# Patient Record
Sex: MALE
[De-identification: names, ages, dates, MRNs, and addresses within clinical notes are randomized per-mention and may not be internally consistent; named-entity substitution may affect disease eponyms.]

---

## 2017-05-12 ENCOUNTER — APPOINTMENT (OUTPATIENT)
Dept: BARIATRICS/WEIGHT MGMT | Facility: CLINIC | Age: 47
End: 2017-05-12

## 2017-05-12 VITALS
HEIGHT: 72 IN | BODY MASS INDEX: 33.93 KG/M2 | HEART RATE: 97 BPM | TEMPERATURE: 98 F | OXYGEN SATURATION: 98 % | SYSTOLIC BLOOD PRESSURE: 138 MMHG | DIASTOLIC BLOOD PRESSURE: 88 MMHG | WEIGHT: 250.5 LBS

## 2017-05-12 DIAGNOSIS — K21.9 GASTRO-ESOPHAGEAL REFLUX DISEASE W/OUT ESOPHAGITIS: ICD-10-CM

## 2017-08-01 ENCOUNTER — MESSAGE (OUTPATIENT)
Age: 47
End: 2017-08-01

## 2017-08-04 ENCOUNTER — APPOINTMENT (OUTPATIENT)
Dept: BARIATRICS/WEIGHT MGMT | Facility: CLINIC | Age: 47
End: 2017-08-04
Payer: COMMERCIAL

## 2017-08-04 VITALS
WEIGHT: 245 LBS | OXYGEN SATURATION: 98 % | TEMPERATURE: 98.4 F | DIASTOLIC BLOOD PRESSURE: 90 MMHG | BODY MASS INDEX: 33.18 KG/M2 | HEIGHT: 72 IN | SYSTOLIC BLOOD PRESSURE: 142 MMHG | HEART RATE: 100 BPM

## 2017-08-04 PROCEDURE — 99214 OFFICE O/P EST MOD 30 MIN: CPT

## 2017-08-18 ENCOUNTER — APPOINTMENT (OUTPATIENT)
Dept: BARIATRICS/WEIGHT MGMT | Facility: CLINIC | Age: 47
End: 2017-08-18
Payer: COMMERCIAL

## 2017-08-18 VITALS
HEART RATE: 61 BPM | SYSTOLIC BLOOD PRESSURE: 138 MMHG | TEMPERATURE: 98.1 F | OXYGEN SATURATION: 98 % | DIASTOLIC BLOOD PRESSURE: 90 MMHG | HEIGHT: 72 IN | WEIGHT: 246 LBS | BODY MASS INDEX: 33.32 KG/M2

## 2017-08-18 PROCEDURE — 99214 OFFICE O/P EST MOD 30 MIN: CPT

## 2017-09-08 ENCOUNTER — APPOINTMENT (OUTPATIENT)
Dept: BARIATRICS/WEIGHT MGMT | Facility: CLINIC | Age: 47
End: 2017-09-08
Payer: COMMERCIAL

## 2017-09-08 VITALS
TEMPERATURE: 98 F | SYSTOLIC BLOOD PRESSURE: 124 MMHG | OXYGEN SATURATION: 98 % | DIASTOLIC BLOOD PRESSURE: 72 MMHG | BODY MASS INDEX: 33.12 KG/M2 | WEIGHT: 244.5 LBS | HEART RATE: 77 BPM | HEIGHT: 72 IN

## 2017-09-08 PROCEDURE — 99214 OFFICE O/P EST MOD 30 MIN: CPT

## 2017-09-29 ENCOUNTER — RX RENEWAL (OUTPATIENT)
Age: 47
End: 2017-09-29

## 2017-09-29 ENCOUNTER — APPOINTMENT (OUTPATIENT)
Dept: BARIATRICS/WEIGHT MGMT | Facility: CLINIC | Age: 47
End: 2017-09-29
Payer: COMMERCIAL

## 2017-09-29 VITALS
HEART RATE: 88 BPM | OXYGEN SATURATION: 98 % | SYSTOLIC BLOOD PRESSURE: 136 MMHG | BODY MASS INDEX: 32.1 KG/M2 | HEIGHT: 72 IN | WEIGHT: 237 LBS | DIASTOLIC BLOOD PRESSURE: 80 MMHG | TEMPERATURE: 98 F

## 2017-09-29 PROCEDURE — 99213 OFFICE O/P EST LOW 20 MIN: CPT

## 2017-09-29 RX ORDER — PEN NEEDLE, DIABETIC 30 GX 1/3"
32G X 5 MM NEEDLE, DISPOSABLE MISCELLANEOUS
Qty: 90 | Refills: 2 | Status: ACTIVE | COMMUNITY
Start: 2017-09-29 | End: 1900-01-01

## 2017-09-29 RX ORDER — LANCETS 30 GAUGE
EACH MISCELLANEOUS
Qty: 120 | Refills: 3 | Status: ACTIVE | COMMUNITY
Start: 2017-09-29 | End: 1900-01-01

## 2017-11-10 ENCOUNTER — APPOINTMENT (OUTPATIENT)
Dept: BARIATRICS/WEIGHT MGMT | Facility: CLINIC | Age: 47
End: 2017-11-10
Payer: COMMERCIAL

## 2017-11-10 VITALS
DIASTOLIC BLOOD PRESSURE: 70 MMHG | OXYGEN SATURATION: 98 % | TEMPERATURE: 97.9 F | HEART RATE: 87 BPM | SYSTOLIC BLOOD PRESSURE: 124 MMHG | HEIGHT: 72 IN | WEIGHT: 237.5 LBS | BODY MASS INDEX: 32.17 KG/M2

## 2017-11-10 PROCEDURE — 99214 OFFICE O/P EST MOD 30 MIN: CPT

## 2017-11-10 RX ORDER — ATORVASTATIN CALCIUM 10 MG/1
10 TABLET, FILM COATED ORAL
Qty: 30 | Refills: 0 | Status: ACTIVE | COMMUNITY
Start: 2017-10-24

## 2017-11-10 RX ORDER — AZELASTINE HYDROCHLORIDE AND FLUTICASONE PROPIONATE 137; 50 UG/1; UG/1
137-50 SPRAY, METERED NASAL
Qty: 23 | Refills: 0 | Status: ACTIVE | COMMUNITY
Start: 2017-09-06

## 2017-12-08 ENCOUNTER — APPOINTMENT (OUTPATIENT)
Dept: BARIATRICS/WEIGHT MGMT | Facility: CLINIC | Age: 47
End: 2017-12-08

## 2018-01-05 ENCOUNTER — APPOINTMENT (OUTPATIENT)
Dept: BARIATRICS/WEIGHT MGMT | Facility: CLINIC | Age: 48
End: 2018-01-05
Payer: COMMERCIAL

## 2018-01-05 VITALS
BODY MASS INDEX: 32.91 KG/M2 | HEIGHT: 72 IN | DIASTOLIC BLOOD PRESSURE: 88 MMHG | OXYGEN SATURATION: 98 % | WEIGHT: 243 LBS | TEMPERATURE: 97.9 F | HEART RATE: 77 BPM | SYSTOLIC BLOOD PRESSURE: 140 MMHG

## 2018-01-05 PROCEDURE — 99214 OFFICE O/P EST MOD 30 MIN: CPT

## 2018-02-01 ENCOUNTER — APPOINTMENT (OUTPATIENT)
Dept: BARIATRICS/WEIGHT MGMT | Facility: CLINIC | Age: 48
End: 2018-02-01

## 2018-02-23 ENCOUNTER — APPOINTMENT (OUTPATIENT)
Dept: BARIATRICS/WEIGHT MGMT | Facility: CLINIC | Age: 48
End: 2018-02-23
Payer: COMMERCIAL

## 2018-02-23 VITALS
DIASTOLIC BLOOD PRESSURE: 88 MMHG | OXYGEN SATURATION: 98 % | HEIGHT: 72 IN | SYSTOLIC BLOOD PRESSURE: 152 MMHG | TEMPERATURE: 98.5 F | WEIGHT: 245 LBS | HEART RATE: 98 BPM | BODY MASS INDEX: 33.18 KG/M2

## 2018-02-23 DIAGNOSIS — F41.8 OTHER SPECIFIED ANXIETY DISORDERS: ICD-10-CM

## 2018-02-23 PROCEDURE — 99214 OFFICE O/P EST MOD 30 MIN: CPT

## 2018-03-23 ENCOUNTER — RX RENEWAL (OUTPATIENT)
Age: 48
End: 2018-03-23

## 2018-04-13 ENCOUNTER — APPOINTMENT (OUTPATIENT)
Dept: BARIATRICS/WEIGHT MGMT | Facility: CLINIC | Age: 48
End: 2018-04-13
Payer: COMMERCIAL

## 2018-04-13 VITALS
HEART RATE: 94 BPM | WEIGHT: 257.5 LBS | HEIGHT: 72 IN | BODY MASS INDEX: 34.88 KG/M2 | SYSTOLIC BLOOD PRESSURE: 146 MMHG | TEMPERATURE: 97.8 F | OXYGEN SATURATION: 97 % | DIASTOLIC BLOOD PRESSURE: 86 MMHG

## 2018-04-13 PROCEDURE — 99214 OFFICE O/P EST MOD 30 MIN: CPT

## 2020-01-09 ENCOUNTER — TRANSCRIPTION ENCOUNTER (OUTPATIENT)
Age: 50
End: 2020-01-09

## 2020-01-10 ENCOUNTER — APPOINTMENT (OUTPATIENT)
Dept: BARIATRICS/WEIGHT MGMT | Facility: CLINIC | Age: 50
End: 2020-01-10
Payer: COMMERCIAL

## 2020-01-10 VITALS
OXYGEN SATURATION: 88 % | DIASTOLIC BLOOD PRESSURE: 84 MMHG | SYSTOLIC BLOOD PRESSURE: 156 MMHG | BODY MASS INDEX: 36.34 KG/M2 | HEIGHT: 72 IN | HEART RATE: 96 BPM | TEMPERATURE: 98 F | WEIGHT: 268.31 LBS

## 2020-01-10 PROCEDURE — 99214 OFFICE O/P EST MOD 30 MIN: CPT

## 2020-01-10 RX ORDER — ATENOLOL 25 MG/1
25 TABLET ORAL
Refills: 0 | Status: ACTIVE | COMMUNITY

## 2020-01-10 RX ORDER — NALTREXONE HYDROCHLORIDE 50 MG/1
50 TABLET, FILM COATED ORAL DAILY
Qty: 90 | Refills: 1 | Status: DISCONTINUED | COMMUNITY
Start: 2018-01-05 | End: 2020-01-10

## 2020-01-10 RX ORDER — CYCLOSPORINE 0.5 MG/ML
0.05 EMULSION OPHTHALMIC
Qty: 18 | Refills: 0 | Status: DISCONTINUED | COMMUNITY
Start: 2017-08-11 | End: 2020-01-10

## 2020-01-10 RX ORDER — OLOPATADINE HYDROCHLORIDE 7 MG/ML
0.7 SOLUTION OPHTHALMIC
Qty: 2 | Refills: 0 | Status: DISCONTINUED | COMMUNITY
Start: 2017-08-28 | End: 2020-01-10

## 2020-01-10 RX ORDER — EMPAGLIFLOZIN 10 MG/1
10 TABLET, FILM COATED ORAL
Qty: 30 | Refills: 0 | Status: DISCONTINUED | COMMUNITY
Start: 2017-08-11 | End: 2020-01-10

## 2020-01-10 RX ORDER — AMLODIPINE BESYLATE 10 MG/1
10 TABLET ORAL
Refills: 0 | Status: ACTIVE | COMMUNITY

## 2020-01-31 ENCOUNTER — APPOINTMENT (OUTPATIENT)
Dept: BARIATRICS/WEIGHT MGMT | Facility: CLINIC | Age: 50
End: 2020-01-31
Payer: COMMERCIAL

## 2020-01-31 VITALS
OXYGEN SATURATION: 96 % | HEIGHT: 72 IN | BODY MASS INDEX: 35.39 KG/M2 | TEMPERATURE: 98.1 F | WEIGHT: 261.31 LBS | SYSTOLIC BLOOD PRESSURE: 120 MMHG | DIASTOLIC BLOOD PRESSURE: 77 MMHG | HEART RATE: 89 BPM

## 2020-01-31 PROCEDURE — 99214 OFFICE O/P EST MOD 30 MIN: CPT

## 2020-02-09 NOTE — ASSESSMENT
[FreeTextEntry1] : BARIATRIC SURGERY HISTORY: RYGB (max weight 390)\par \par OBESITY COMORBIDITIES: DM2 (back on insulin), metabolic syndrome, AUUGSTINE\par \par ANTI-OBESITY MEDICATIONS: off and on GLP-1 agonist\par \par OBESITY MEDICATION SIDE EFFECTS: none\par \par we had a lengthy talk about his glycemic control and carb free attempts at weight loss.  it is conceivable that his multiple issues with acidosis were in the setting of complete carb restriction/ketoacidosis that was diet related and not from massive hyperglycemia.  As such recommend a whole foods based strategy that reintroduces legumes, possibly some fruit and increases vegetable and fiber intake.  also recommend:\par \par 1. restart glp-1 agonist.  will give trulicity 0.75 mg at first\par 2. decrease insulin to 20 units/day but given his knowledge/experience discretion to lower amount\par 3. increase cardio exercise\par 4. keep food, glucose and activity logs\par \par rtc in 3 weeks and will likely increase trulicity and decrease insulin at that time.\par \par \par

## 2020-02-09 NOTE — ASSESSMENT
[FreeTextEntry1] : BARIATRIC SURGERY HISTORY: RYGB (max weight 390)\par \par OBESITY COMORBIDITIES: DM2 (back on insulin), metabolic syndrome, AUGUSTINE\par \par ANTI-OBESITY MEDICATIONS: off and on GLP-1 agonist\par \par OBESITY MEDICATION SIDE EFFECTS: none\par \par we had a conversation about trying to avoid saturated fat altogether (increases insulin resistance).  otherwise stick to whole foods, mainly plant based and keep grains relatively restricted (but fruits, vegetables and legumes should be eaten more).  cont with increased exercise.\par increase trulicity to 1.5mg/weekly\par decrease insulin to 10 units QHS though with insulin resistance that is low dose, so low threshold to DC\par rtc in 3 weeks again.\par \par rtc in 3 weeks and will likely increase trulicity and decrease insulin at that time.\par \par \par

## 2020-02-09 NOTE — HISTORY OF PRESENT ILLNESS
[FreeTextEntry1] : 50 yo man with obesity (s/p RYGB), metabolic syndrome and AUGUSTINE returns to clinic.  Moved to [place] and was there for 1.5 years.  He regained 11 lbs.  Had 1 hospitalization for lactic acidosis and was put back on insulin.  now taking 40 units/day long acting.  He continues trying to stick to a completely carbohydrate restricted diet (in spite of now two episodes of acidosis).  He is active again now and is without new complaints today.

## 2020-02-09 NOTE — HISTORY OF PRESENT ILLNESS
[FreeTextEntry1] : 48 yo man with obesity (s/p RYGB), metabolic syndrome and AUGUSTINE returns to clinic. He has lost 7 lbs in past three weeks.  he has introduced more produce and legumes to his diet and dramatically reduced refined foods and fat.  he is moderately active.  glucose well controlled on decreased insulin.  He is without new complaints today.

## 2020-02-28 ENCOUNTER — APPOINTMENT (OUTPATIENT)
Dept: BARIATRICS/WEIGHT MGMT | Facility: CLINIC | Age: 50
End: 2020-02-28
Payer: COMMERCIAL

## 2020-02-28 VITALS
OXYGEN SATURATION: 98 % | TEMPERATURE: 97.8 F | SYSTOLIC BLOOD PRESSURE: 120 MMHG | BODY MASS INDEX: 34.72 KG/M2 | DIASTOLIC BLOOD PRESSURE: 78 MMHG | WEIGHT: 256.31 LBS | HEART RATE: 80 BPM | HEIGHT: 72 IN

## 2020-02-28 PROCEDURE — 99214 OFFICE O/P EST MOD 30 MIN: CPT

## 2020-03-23 NOTE — HISTORY OF PRESENT ILLNESS
[FreeTextEntry1] : 50 yo man with obesity (s/p RYGB), metabolic syndrome and AUGUSTINE returns to clinic. He has lost an additional 5 lbs in past 4 weeks weeks (now 12 lbs in past seven weeks).  he has introduced more produce and legumes to his diet and dramatically reduced refined foods and fat.  he is moderately active.  glucose well controlled now back off of insulin - still on glp-1 agonist and metformin.  He is without new complaints today.

## 2020-03-23 NOTE — ASSESSMENT
[FreeTextEntry1] : BARIATRIC SURGERY HISTORY: RYGB (max weight 390)\par \par OBESITY COMORBIDITIES: DM2 (now back off of insulin), metabolic syndrome, AUGUSTINE\par \par ANTI-OBESITY MEDICATIONS: off and on GLP-1 agonist\par \par OBESITY MEDICATION SIDE EFFECTS: none\par \par continue to advance unrefined carbohydrates particularly legumes and produce\par increased exercise.\par cont trulicity to 1.5mg/weekly\par \par \par rtc in 3 weeks\par \par \par

## 2020-03-27 ENCOUNTER — APPOINTMENT (OUTPATIENT)
Dept: BARIATRICS/WEIGHT MGMT | Facility: CLINIC | Age: 50
End: 2020-03-27
Payer: COMMERCIAL

## 2020-03-27 PROCEDURE — 99442: CPT

## 2020-04-24 ENCOUNTER — APPOINTMENT (OUTPATIENT)
Dept: BARIATRICS/WEIGHT MGMT | Facility: CLINIC | Age: 50
End: 2020-04-24
Payer: COMMERCIAL

## 2020-04-24 PROCEDURE — 99213 OFFICE O/P EST LOW 20 MIN: CPT | Mod: 95

## 2020-04-24 NOTE — HISTORY OF PRESENT ILLNESS
[FreeTextEntry1] : 50 yo man with obesity (s/p RYGB), metabolic syndrome and AUGUSTINE returns to clinic. body weight decrease continues with same slope.  He has now lost 10% of body weight in previous 3+ months since return.  He is happy with progress.  He has been staying away from refined carbohydrates and added saturated fats.  He is walking daily, but less overall than usual and no other formal exercise.  AM fasting glucose 120-130 range still without insulin.  He is without new complaints today.

## 2020-04-24 NOTE — ASSESSMENT
[FreeTextEntry1] : BARIATRIC SURGERY HISTORY: RYGB (max weight 390)\par \par OBESITY COMORBIDITIES: DM2 (now back off of insulin), metabolic syndrome, AUGUSTINE\par \par ANTI-OBESITY MEDICATIONS: off and on GLP-1 agonist\par \par OBESITY MEDICATION SIDE EFFECTS: none\par \par cont whole foods, lower fat, high fiber diet\par needs to find daily exercise regimen, add home exercise to walking\par cont trulicity to 1.5mg/weekly\par \par \par rtc in 3 weeks\par \par \par

## 2020-04-24 NOTE — REASON FOR VISIT
[Diabetes Mellitus] : diabetes mellitus [Follow-Up Visit] : a follow-up visit for [Obesity] : obesity

## 2020-06-12 ENCOUNTER — APPOINTMENT (OUTPATIENT)
Dept: BARIATRICS/WEIGHT MGMT | Facility: CLINIC | Age: 50
End: 2020-06-12
Payer: COMMERCIAL

## 2020-06-12 PROCEDURE — 99213 OFFICE O/P EST LOW 20 MIN: CPT | Mod: 95

## 2020-06-23 RX ORDER — DULAGLUTIDE 0.75 MG/.5ML
0.75 INJECTION, SOLUTION SUBCUTANEOUS
Qty: 1 | Refills: 5 | Status: DISCONTINUED | COMMUNITY
Start: 2020-01-14 | End: 2020-06-23
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
## 2020-08-07 ENCOUNTER — APPOINTMENT (OUTPATIENT)
Dept: BARIATRICS/WEIGHT MGMT | Facility: CLINIC | Age: 50
End: 2020-08-07
Payer: COMMERCIAL

## 2020-08-07 PROCEDURE — 99213 OFFICE O/P EST LOW 20 MIN: CPT | Mod: 95

## 2020-08-07 NOTE — REASON FOR VISIT
[Diabetes Mellitus] : diabetes mellitus [Follow-Up Visit] : a follow-up visit for [Obesity] : obesity [Obstructive Sleep Apena] : obstructive sleep apena

## 2020-08-07 NOTE — ASSESSMENT
[FreeTextEntry1] : BARIATRIC SURGERY HISTORY: RYGB (max weight 390)\par \par OBESITY COMORBIDITIES: DM2 (now back off of insulin), metabolic syndrome, AUGUSTINE\par \par ANTI-OBESITY MEDICATIONS: off and on GLP-1 agonist\par \par OBESITY MEDICATION SIDE EFFECTS: none\par \par cont whole foods, lower fat, high fiber diet\par cont trulicity to 1.5mg/weekly\par cont regular walking\par add squats, pushups, etc after walk to improve insulin sensitivity\par will check some other pre-prandial glucose values as well as AM\par \par rtc in 6 weeks.\par \par \par rtc in 3 weeks\par \par \par

## 2020-08-07 NOTE — HISTORY OF PRESENT ILLNESS
[Home] : at home, [unfilled] , at the time of the visit. [Medical Office: ([facility])___] : at the medical office located in  [FreeTextEntry1] : 50 yo man with obesity (s/p RYGB), metabolic syndrome and AUGUSTINE returns to clinic. body weight decrease continues with same slope. He feels that he is doing well overall with eating.  AM fasting blood sugar remains  = 120 on glp=1 agonist and metformin.  weight is down to 231. Is doing some walking but has not added moderate or heavy intensity exercise.  Is without new complaints today. [Verbal consent obtained from patient] : the patient, [unfilled]

## 2020-11-24 ENCOUNTER — APPOINTMENT (OUTPATIENT)
Dept: BARIATRICS/WEIGHT MGMT | Facility: CLINIC | Age: 50
End: 2020-11-24
Payer: COMMERCIAL

## 2020-11-24 PROCEDURE — 99214 OFFICE O/P EST MOD 30 MIN: CPT | Mod: 95

## 2020-11-28 NOTE — ASSESSMENT
[FreeTextEntry1] : BARIATRIC SURGERY HISTORY: RYGB (max weight 390)\par \par OBESITY COMORBIDITIES: DM2 (now back off of insulin), metabolic syndrome, AUGUSTINE\par \par ANTI-OBESITY MEDICATIONS: off and on GLP-1 agonist\par \par OBESITY MEDICATION SIDE EFFECTS: none\par \par cont whole foods, lower fat, high fiber diet\par discussed contingency mgt for travel days\par cont trulicity to 1.5mg/weekly - might consider increasing to newly approved dose when he returns home\par cont regular walking\par can do some other exercises in room\par \par rtc in 3-4 weeks\par \par \par

## 2020-11-28 NOTE — HISTORY OF PRESENT ILLNESS
[Other Location: e.g. School (Enter Location, City,State)___] : at [unfilled], at the time of the visit. [Other Location: e.g. Home (Enter Location, City,State)___] : at [unfilled] [Verbal consent obtained from patient] : the patient, [unfilled] [FreeTextEntry1] : 48 yo man with obesity (s/p RYGB), DM2, metabolic syndrome and AUGUSTINE returns to clinic. body weight decrease continues with same slope. He feels that he is doing well overall with eating although he has been traveling and so there have been more "nutrition bars" sneaking in for breakfast, snack or meal replacements).  AM fasting blood sugar remains  under decent control with  glp=1 agonist and metformin.  weight is down to 226 lbs.  not engaging in a lot of physical activity on travel days otherwise walking. \par he Is without new complaints today.

## 2020-12-11 ENCOUNTER — TRANSCRIPTION ENCOUNTER (OUTPATIENT)
Age: 50
End: 2020-12-11

## 2021-05-07 ENCOUNTER — APPOINTMENT (OUTPATIENT)
Dept: BARIATRICS/WEIGHT MGMT | Facility: CLINIC | Age: 51
End: 2021-05-07
Payer: COMMERCIAL

## 2021-05-07 PROCEDURE — 99213 OFFICE O/P EST LOW 20 MIN: CPT | Mod: 95

## 2021-05-08 NOTE — HISTORY OF PRESENT ILLNESS
[FreeTextEntry1] : 51 yo man with obesity (s/p RYGB), DM2, metabolic syndrome and AUGUSTINE returns to clinic.\par he has been home for a couple of monnths\par weight has decreased 1 lb since last visit 5 months ago now at 225lbs\par reports that he has started exercising regularly and is now using peloton - has done 99 rides in last several months.  \par eating has been very sporadic and feels he lost some control\par overall he feels well, though and is without new complaints today. [Other Location: e.g. School (Enter Location, City,State)___] : at [unfilled], at the time of the visit. [Other Location: e.g. Home (Enter Location, City,State)___] : at [unfilled] [Verbal consent obtained from patient] : the patient, [unfilled]

## 2021-05-08 NOTE — ASSESSMENT
[FreeTextEntry1] : BARIATRIC SURGERY HISTORY: RYGB (max weight 390)\par \par OBESITY COMORBIDITIES: DM2 (now back off of insulin), metabolic syndrome, AUGUSTINE\par \par ANTI-OBESITY MEDICATIONS: on glp-1 agonist\par \par OBESITY MEDICATION SIDE EFFECTS: none\par \par given info for whole foods and plant leaning diet to resume\par cont trulicity to 1.5mg/weekly \par cont regular peloton and walk on non-exercise days\par \par rtc in 3-4 weeks\par \par \par

## 2021-07-30 ENCOUNTER — TRANSCRIPTION ENCOUNTER (OUTPATIENT)
Age: 51
End: 2021-07-30

## 2021-08-13 ENCOUNTER — APPOINTMENT (OUTPATIENT)
Dept: BARIATRICS/WEIGHT MGMT | Facility: CLINIC | Age: 51
End: 2021-08-13
Payer: COMMERCIAL

## 2021-08-13 PROCEDURE — 99213 OFFICE O/P EST LOW 20 MIN: CPT | Mod: 95

## 2021-08-17 RX ORDER — BLOOD SUGAR DIAGNOSTIC
STRIP MISCELLANEOUS
Qty: 1 | Refills: 5 | Status: ACTIVE | COMMUNITY
Start: 2021-08-17 | End: 1900-01-01

## 2021-10-21 RX ORDER — INSULIN DEGLUDEC INJECTION 100 U/ML
100 INJECTION, SOLUTION SUBCUTANEOUS DAILY
Qty: 1 | Refills: 0 | Status: DISCONTINUED | COMMUNITY
Start: 2020-01-10 | End: 2021-10-21

## 2021-10-21 NOTE — HISTORY OF PRESENT ILLNESS
[FreeTextEntry1] : 51 yo man with obesity (s/p RYGB), DM2, metabolic syndrome and AUGUSTINE returns to clinic.\par weight is now down to 220 lbs\par body fat has dropped from 25% down to 29-30%\par has now done 260 peloton rides and feels that the exercise has made a large difference in his overall progress and outlook\par \par \par reports that he has started exercising regularly and is now using peloton - has done 99 rides in last several months.  \par eating has been very sporadic and feels he lost some control\par overall he feels well, though and is without new complaints today. [Other Location: e.g. School (Enter Location, City,State)___] : at [unfilled], at the time of the visit. [Other Location: e.g. Home (Enter Location, City,State)___] : at [unfilled] [Verbal consent obtained from patient] : the patient, [unfilled]

## 2021-10-21 NOTE — ASSESSMENT
[FreeTextEntry1] : BARIATRIC SURGERY HISTORY: RYGB (max weight 390)\par \par OBESITY COMORBIDITIES: DM2 (now back off of insulin), metabolic syndrome, AUGUSTINE\par \par ANTI-OBESITY MEDICATIONS: on glp-1 agonist\par \par OBESITY MEDICATION SIDE EFFECTS: none\par \par cont a diet that is NOT completely carbohydrate restricted but focuses on whole, unrefined carbohydrates\par should be protein sparing but minimize animal protein consumption\par cont with extensive exercise\par ok to increase truclicity to 3.0 mg to see if it helps with weight loss\par \par rtc in 1-3 months \par \par \par \par \par

## 2021-12-01 ENCOUNTER — APPOINTMENT (OUTPATIENT)
Dept: BARIATRICS/WEIGHT MGMT | Facility: CLINIC | Age: 51
End: 2021-12-01
Payer: COMMERCIAL

## 2021-12-01 PROCEDURE — 99213 OFFICE O/P EST LOW 20 MIN: CPT | Mod: 95

## 2021-12-14 ENCOUNTER — TRANSCRIPTION ENCOUNTER (OUTPATIENT)
Age: 51
End: 2021-12-14

## 2022-02-02 NOTE — HISTORY OF PRESENT ILLNESS
[Other Location: e.g. School (Enter Location, City,State)___] : at [unfilled], at the time of the visit. [Other Location: e.g. Home (Enter Location, City,State)___] : at [unfilled] [Verbal consent obtained from patient] : the patient, [unfilled] [FreeTextEntry1] : 49 yo man with obesity (s/p RYGB), DM2, metabolic syndrome and AUGUSTINE returns to clinic.\par weight is now 225 lbs \par body fat still in mid-20s%\par continues with regular peloton exercise\par \par has been suffering from some depression.  tried ssri\par \par food generally continues to be ok but is concerned about mood and motivation\par \par otherwise without new complaints today

## 2022-02-02 NOTE — ASSESSMENT
[FreeTextEntry1] : BARIATRIC SURGERY HISTORY: RYGB (max weight 390)\par \par OBESITY COMORBIDITIES: DM2 (now back off of insulin), metabolic syndrome, AUGUSTINE\par \par ANTI-OBESITY MEDICATIONS: on glp-1 agonist\par \par OBESITY MEDICATION SIDE EFFECTS: none\par \par \par whole foods base diet that is not completely absent of carbohydrates\par should be protein sparing but minimize animal protein consumption\par cont with extensive exercise\par cont trulicity 3 mg\par discussed mindfulness practice\par \par rtc in 1-3 months \par \par \par \par \par

## 2022-06-21 ENCOUNTER — RX RENEWAL (OUTPATIENT)
Age: 52
End: 2022-06-21

## 2022-07-08 ENCOUNTER — APPOINTMENT (OUTPATIENT)
Dept: BARIATRICS/WEIGHT MGMT | Facility: CLINIC | Age: 52
End: 2022-07-08

## 2022-07-08 PROCEDURE — 99213 OFFICE O/P EST LOW 20 MIN: CPT | Mod: 95

## 2022-07-22 NOTE — HISTORY OF PRESENT ILLNESS
[Other Location: e.g. School (Enter Location, City,State)___] : at [unfilled], at the time of the visit. [Other Location: e.g. Home (Enter Location, City,State)___] : at [unfilled] [Verbal consent obtained from patient] : the patient, [unfilled] [FreeTextEntry1] : 50 yo man with obesity (s/p RYGB), DM2, metabolic syndrome and AUGUSTINE returns for f/u\par \par weight remains relatively stable 225 lbs +/-\par max lifetime weight = 420 lbs\par body fat still in mid-20s%\par continues with regular peloton exercise\par \par mood has been ok, some increase in appetite and stagnant weight \par \par otherwise without new complaints today

## 2022-07-22 NOTE — ASSESSMENT
[FreeTextEntry1] : BARIATRIC SURGERY HISTORY: RYGB (max weight 390)\par \par OBESITY COMORBIDITIES: DM2 (now back off of insulin), metabolic syndrome, AUGUSTINE\par \par ANTI-OBESITY MEDICATIONS: on glp-1 agonist\par \par OBESITY MEDICATION SIDE EFFECTS: none\par \par \par whole foods base diet that is not completely absent of carbohydrates\par should be protein sparing but minimize animal protein consumption\par cont with extensive exercise\par increase trulicity to 4.5  mg\par discussed mindfulness practice, cont therapy\par \par rtc in 1-3 months \par \par \par \par \par

## 2022-11-25 ENCOUNTER — NON-APPOINTMENT (OUTPATIENT)
Age: 52
End: 2022-11-25

## 2023-04-14 ENCOUNTER — NON-APPOINTMENT (OUTPATIENT)
Age: 53
End: 2023-04-14

## 2023-05-10 ENCOUNTER — APPOINTMENT (OUTPATIENT)
Dept: BARIATRICS/WEIGHT MGMT | Facility: CLINIC | Age: 53
End: 2023-05-10
Payer: COMMERCIAL

## 2023-05-10 PROCEDURE — 99214 OFFICE O/P EST MOD 30 MIN: CPT | Mod: 95

## 2023-05-10 RX ORDER — TIRZEPATIDE 5 MG/.5ML
5 INJECTION, SOLUTION SUBCUTANEOUS
Qty: 4 | Refills: 5 | Status: ACTIVE | COMMUNITY
Start: 2023-05-10 | End: 1900-01-01

## 2023-05-10 RX ORDER — DULAGLUTIDE 3 MG/.5ML
3 INJECTION, SOLUTION SUBCUTANEOUS
Qty: 2 | Refills: 4 | Status: DISCONTINUED | COMMUNITY
Start: 2021-08-17 | End: 2023-05-10

## 2023-05-10 RX ORDER — DULAGLUTIDE 1.5 MG/.5ML
1.5 INJECTION, SOLUTION SUBCUTANEOUS
Qty: 1 | Refills: 5 | Status: DISCONTINUED | COMMUNITY
Start: 2020-01-31 | End: 2023-05-10

## 2023-05-11 NOTE — HISTORY OF PRESENT ILLNESS
[FreeTextEntry1] : 52 yo man with obesity (s/p RYGB), DM2, metabolic syndrome and AUGUSTINE returns for f/u\par \par has gained roughly 20 lbs since last visit\par had worsening depression with some dissociative features\par has been stabilized on prozac and respirdal but that has included this weight gain, increased appetite, etc\par was not working out much at its worst but now back to extensive exercise and has cleaned up eating \par max lifetime weight = 420 lbs\par body fat still in mid-20s%\par \par \par he would like to avoid continued weight gain and increased blood glucose [Home] : at home, [unfilled] , at the time of the visit. [Other Location: e.g. Home (Enter Location, City,State)___] : at [unfilled] [Verbal consent obtained from patient] : the patient, [unfilled]

## 2023-05-11 NOTE — ASSESSMENT
[FreeTextEntry1] : BARIATRIC SURGERY HISTORY: RYGB (max weight 390)\par \par OBESITY COMORBIDITIES: DM2 (now back off of insulin), metabolic syndrome, AUGUSTINE\par \par ANTI-OBESITY MEDICATIONS: on glp-1 agonist\par \par OBESITY MEDICATION SIDE EFFECTS: none\par \par \par whole foods base diet that is not completely absent of carbohydrates\par should be protein sparing but minimize animal protein consumption\par cont with extensive exercise\par switch from truicity to mounjaro 5mg with monthly up-titration\par try to avoid exposure to high calorie/reward foods\par \par \par rtc in 1-3 months \par \par \par \par \par

## 2023-06-05 ENCOUNTER — TRANSCRIPTION ENCOUNTER (OUTPATIENT)
Age: 53
End: 2023-06-05

## 2023-06-20 ENCOUNTER — APPOINTMENT (OUTPATIENT)
Dept: BARIATRICS/WEIGHT MGMT | Facility: CLINIC | Age: 53
End: 2023-06-20
Payer: COMMERCIAL

## 2023-06-20 PROCEDURE — 99213 OFFICE O/P EST LOW 20 MIN: CPT | Mod: 95

## 2023-06-26 NOTE — HISTORY OF PRESENT ILLNESS
[FreeTextEntry1] : 52 yo man with obesity (s/p RYGB), DM2, metabolic syndrome and AUGUSTINE returns for f/u\par \par weight has stabilized since the last visit (currently 420 lbs)\par max lifetime weight = 420 lbs \par has been stable on prozac and Risperdal\par tolerating change from trulicity to mounjaro 5mg and then 7.5mg\par \par ok with progress of past month [Home] : at home, [unfilled] , at the time of the visit. [Other Location: e.g. Home (Enter Location, City,State)___] : at [unfilled] [Verbal consent obtained from patient] : the patient, [unfilled]

## 2023-06-26 NOTE — REASON FOR VISIT
[Home] : at home, [unfilled] , at the time of the visit. [Medical Office: ([facility])___] : at the medical office located in  [Patient] : the patient [Follow-Up Visit] : a follow-up visit for [Diabetes Mellitus] : diabetes mellitus [Obesity] : obesity

## 2023-06-26 NOTE — ASSESSMENT
[FreeTextEntry1] : BARIATRIC SURGERY HISTORY: RYGB (max weight 390)\par \par OBESITY COMORBIDITIES: DM2 (now back off of insulin), metabolic syndrome, AUGUSTINE\par \par ANTI-OBESITY MEDICATIONS: on glp-1 agonist\par \par OBESITY MEDICATION SIDE EFFECTS: none\par \par \par whole foods base diet that is not completely absent of carbohydrates\par should be protein sparing but minimize animal protein consumption\par cont with extensive exercise\par increase mounjaro to 10mg\par try to avoid exposure to high calorie/reward foods\par \par \par rtc in 1-3 months \par \par \par \par \par

## 2023-09-10 ENCOUNTER — NON-APPOINTMENT (OUTPATIENT)
Age: 53
End: 2023-09-10

## 2023-09-29 RX ORDER — TIRZEPATIDE 10 MG/.5ML
10 INJECTION, SOLUTION SUBCUTANEOUS
Qty: 1 | Refills: 5 | Status: ACTIVE | COMMUNITY
Start: 2023-06-20 | End: 1900-01-01

## 2023-10-02 RX ORDER — TIRZEPATIDE 15 MG/.5ML
15 INJECTION, SOLUTION SUBCUTANEOUS
Qty: 3 | Refills: 3 | Status: ACTIVE | COMMUNITY
Start: 2023-10-02 | End: 1900-01-01

## 2023-10-27 ENCOUNTER — OUTPATIENT (OUTPATIENT)
Dept: OUTPATIENT SERVICES | Facility: HOSPITAL | Age: 53
LOS: 1 days | End: 2023-10-27
Payer: COMMERCIAL

## 2023-10-27 ENCOUNTER — APPOINTMENT (OUTPATIENT)
Dept: BARIATRICS/WEIGHT MGMT | Facility: CLINIC | Age: 53
End: 2023-10-27
Payer: COMMERCIAL

## 2023-10-27 DIAGNOSIS — I10 ESSENTIAL (PRIMARY) HYPERTENSION: ICD-10-CM

## 2023-10-27 DIAGNOSIS — E78.2 MIXED HYPERLIPIDEMIA: ICD-10-CM

## 2023-10-27 DIAGNOSIS — G47.33 OBSTRUCTIVE SLEEP APNEA (ADULT) (PEDIATRIC): ICD-10-CM

## 2023-10-27 PROCEDURE — G0463: CPT

## 2023-10-27 PROCEDURE — 99213 OFFICE O/P EST LOW 20 MIN: CPT | Mod: 95

## 2023-11-06 DIAGNOSIS — G47.33 OBSTRUCTIVE SLEEP APNEA (ADULT) (PEDIATRIC): ICD-10-CM

## 2023-11-06 DIAGNOSIS — E78.2 MIXED HYPERLIPIDEMIA: ICD-10-CM

## 2023-11-06 DIAGNOSIS — E78.5 HYPERLIPIDEMIA, UNSPECIFIED: ICD-10-CM

## 2023-11-06 DIAGNOSIS — E66.9 OBESITY, UNSPECIFIED: ICD-10-CM

## 2023-11-06 DIAGNOSIS — E11.69 TYPE 2 DIABETES MELLITUS WITH OTHER SPECIFIED COMPLICATION: ICD-10-CM

## 2024-02-10 NOTE — REASON FOR VISIT
[Follow-Up Visit] : a follow-up visit for [Diabetes Mellitus] : diabetes mellitus [Obesity] : obesity ,[email or web address]

## 2024-03-21 RX ORDER — TIRZEPATIDE 7.5 MG/.5ML
7.5 INJECTION, SOLUTION SUBCUTANEOUS
Qty: 1 | Refills: 5 | Status: ACTIVE | COMMUNITY
Start: 2023-06-05 | End: 1900-01-01

## 2024-05-03 ENCOUNTER — OUTPATIENT (OUTPATIENT)
Dept: OUTPATIENT SERVICES | Facility: HOSPITAL | Age: 54
LOS: 1 days | End: 2024-05-03
Payer: COMMERCIAL

## 2024-05-03 ENCOUNTER — APPOINTMENT (OUTPATIENT)
Dept: BARIATRICS/WEIGHT MGMT | Facility: CLINIC | Age: 54
End: 2024-05-03
Payer: COMMERCIAL

## 2024-05-03 DIAGNOSIS — E78.5 TYPE 2 DIABETES MELLITUS WITH OTHER SPECIFIED COMPLICATION: ICD-10-CM

## 2024-05-03 DIAGNOSIS — E66.9 OBESITY, UNSPECIFIED: ICD-10-CM

## 2024-05-03 DIAGNOSIS — E11.69 TYPE 2 DIABETES MELLITUS WITH OTHER SPECIFIED COMPLICATION: ICD-10-CM

## 2024-05-03 DIAGNOSIS — I10 ESSENTIAL (PRIMARY) HYPERTENSION: ICD-10-CM

## 2024-05-03 DIAGNOSIS — E78.5 HYPERLIPIDEMIA, UNSPECIFIED: ICD-10-CM

## 2024-05-03 PROCEDURE — G0463: CPT

## 2024-05-03 PROCEDURE — 99213 OFFICE O/P EST LOW 20 MIN: CPT | Mod: 95

## 2024-05-19 RX ORDER — DULAGLUTIDE 4.5 MG/.5ML
4.5 INJECTION, SOLUTION SUBCUTANEOUS
Qty: 3 | Refills: 1 | Status: DISCONTINUED | COMMUNITY
Start: 2022-07-08 | End: 2024-05-19

## 2024-05-19 NOTE — HISTORY OF PRESENT ILLNESS
[FreeTextEntry1] : 50 yo man with obesity (s/p RYGB), DM2, metabolic syndrome and AUGUSTINE returns for f/u  weight has stabilized since the last visit (currently 420 lbs) max lifetime weight = 420 lbs  tolerating  mounjaro up to 15mg but has had to be on lower doses off and on due to supply issues  current wt 229 (down from 3 lbs in past 6 months a1c remains 4.9  reamins on risperdal 1mg, fluoxetine 50mg and gabapentin reports that he generally has felt well, appetite controlled and is exercising is having issues with fatigue  Otherwise without new complaints today.

## 2024-05-19 NOTE — ASSESSMENT
[FreeTextEntry1] : BARIATRIC SURGERY HISTORY: RYGB (max weight 390) OBESITY COMORBIDITIES: DM2 (now back off of insulin), metabolic syndrome, AUGUSTINE ANTI-OBESITY MEDICATIONS: on glp-1 agonist OBESITY MEDICATION SIDE EFFECTS: none  Recommend: cont to maximize whole foods base diet that is not completely absent of carbohydrates,  should be protein sparing but minimize animal protein consumption cont with extensive exercise cont w/ mounjaro 15mg  should not be totally starch free at this point and needs to maintain adequate hydration also psych meds could be contributing to fatigue as well might need to add back some calaries as well   rtc in 3 months

## 2024-12-10 RX ORDER — TIRZEPATIDE 15 MG/.5ML
15 INJECTION, SOLUTION SUBCUTANEOUS
Qty: 12 | Refills: 1 | Status: ACTIVE | COMMUNITY
Start: 2024-12-10 | End: 1900-01-01

## 2025-01-07 ENCOUNTER — NON-APPOINTMENT (OUTPATIENT)
Age: 55
End: 2025-01-07

## 2025-01-09 ENCOUNTER — APPOINTMENT (OUTPATIENT)
Dept: BARIATRICS/WEIGHT MGMT | Facility: CLINIC | Age: 55
End: 2025-01-09

## 2025-01-09 DIAGNOSIS — G47.33 OBSTRUCTIVE SLEEP APNEA (ADULT) (PEDIATRIC): ICD-10-CM

## 2025-01-16 ENCOUNTER — OUTPATIENT (OUTPATIENT)
Dept: OUTPATIENT SERVICES | Facility: HOSPITAL | Age: 55
LOS: 1 days | End: 2025-01-16

## 2025-01-16 ENCOUNTER — APPOINTMENT (OUTPATIENT)
Dept: BARIATRICS/WEIGHT MGMT | Facility: CLINIC | Age: 55
End: 2025-01-16
Payer: COMMERCIAL

## 2025-01-16 DIAGNOSIS — I10 ESSENTIAL (PRIMARY) HYPERTENSION: ICD-10-CM

## 2025-01-16 DIAGNOSIS — E78.5 TYPE 2 DIABETES MELLITUS WITH OTHER SPECIFIED COMPLICATION: ICD-10-CM

## 2025-01-16 DIAGNOSIS — E66.9 OBESITY, UNSPECIFIED: ICD-10-CM

## 2025-01-16 DIAGNOSIS — E11.69 TYPE 2 DIABETES MELLITUS WITH OTHER SPECIFIED COMPLICATION: ICD-10-CM

## 2025-01-16 PROCEDURE — 99213 OFFICE O/P EST LOW 20 MIN: CPT | Mod: 95

## 2025-04-11 ENCOUNTER — OUTPATIENT (OUTPATIENT)
Dept: OUTPATIENT SERVICES | Facility: HOSPITAL | Age: 55
LOS: 1 days | End: 2025-04-11

## 2025-04-11 ENCOUNTER — APPOINTMENT (OUTPATIENT)
Dept: BARIATRICS/WEIGHT MGMT | Facility: CLINIC | Age: 55
End: 2025-04-11
Payer: COMMERCIAL

## 2025-04-11 DIAGNOSIS — E66.9 OBESITY, UNSPECIFIED: ICD-10-CM

## 2025-04-11 DIAGNOSIS — E78.5 TYPE 2 DIABETES MELLITUS WITH OTHER SPECIFIED COMPLICATION: ICD-10-CM

## 2025-04-11 DIAGNOSIS — E11.69 TYPE 2 DIABETES MELLITUS WITH OTHER SPECIFIED COMPLICATION: ICD-10-CM

## 2025-04-11 DIAGNOSIS — I10 ESSENTIAL (PRIMARY) HYPERTENSION: ICD-10-CM

## 2025-04-11 PROCEDURE — 99213 OFFICE O/P EST LOW 20 MIN: CPT | Mod: 95

## 2025-04-11 RX ORDER — TOPIRAMATE 25 MG/1
25 CAPSULE, EXTENDED RELEASE ORAL
Qty: 60 | Refills: 5 | Status: ACTIVE | COMMUNITY
Start: 2025-04-11 | End: 1900-01-01

## 2025-07-17 ENCOUNTER — NON-APPOINTMENT (OUTPATIENT)
Age: 55
End: 2025-07-17

## 2025-07-18 ENCOUNTER — APPOINTMENT (OUTPATIENT)
Dept: BARIATRICS/WEIGHT MGMT | Facility: CLINIC | Age: 55
End: 2025-07-18
Payer: COMMERCIAL

## 2025-07-18 ENCOUNTER — OUTPATIENT (OUTPATIENT)
Dept: OUTPATIENT SERVICES | Facility: HOSPITAL | Age: 55
LOS: 1 days | End: 2025-07-18

## 2025-07-18 DIAGNOSIS — I10 ESSENTIAL (PRIMARY) HYPERTENSION: ICD-10-CM

## 2025-07-18 PROCEDURE — G2211 COMPLEX E/M VISIT ADD ON: CPT | Mod: NC,95

## 2025-07-18 PROCEDURE — 99213 OFFICE O/P EST LOW 20 MIN: CPT | Mod: 95

## 2025-09-12 ENCOUNTER — APPOINTMENT (OUTPATIENT)
Dept: BARIATRICS/WEIGHT MGMT | Facility: CLINIC | Age: 55
End: 2025-09-12